# Patient Record
Sex: FEMALE | Race: WHITE | URBAN - METROPOLITAN AREA
[De-identification: names, ages, dates, MRNs, and addresses within clinical notes are randomized per-mention and may not be internally consistent; named-entity substitution may affect disease eponyms.]

---

## 2019-08-06 ENCOUNTER — APPOINTMENT (RX ONLY)
Dept: URBAN - METROPOLITAN AREA CLINIC 167 | Facility: CLINIC | Age: 34
Setting detail: DERMATOLOGY
End: 2019-08-06

## 2019-08-06 DIAGNOSIS — Z71.89 OTHER SPECIFIED COUNSELING: ICD-10-CM

## 2019-08-06 DIAGNOSIS — L30.8 OTHER SPECIFIED DERMATITIS: ICD-10-CM

## 2019-08-06 DIAGNOSIS — L70.0 ACNE VULGARIS: ICD-10-CM

## 2019-08-06 DIAGNOSIS — B07.8 OTHER VIRAL WARTS: ICD-10-CM

## 2019-08-06 PROCEDURE — ? LIQUID NITROGEN

## 2019-08-06 PROCEDURE — ? PRESCRIPTION

## 2019-08-06 PROCEDURE — ? TREATMENT REGIMEN

## 2019-08-06 PROCEDURE — ? COUNSELING

## 2019-08-06 PROCEDURE — 99203 OFFICE O/P NEW LOW 30 MIN: CPT | Mod: 25

## 2019-08-06 PROCEDURE — ? BENIGN DESTRUCTION

## 2019-08-06 PROCEDURE — ? OTHER

## 2019-08-06 PROCEDURE — 17110 DESTRUCTION B9 LES UP TO 14: CPT

## 2019-08-06 RX ORDER — TRETINOIN 0.25 MG/G
CREAM TOPICAL
Qty: 1 | Refills: 6 | Status: ERX | COMMUNITY
Start: 2019-08-06

## 2019-08-06 RX ORDER — DAPSONE 75 MG/G
GEL TOPICAL
Qty: 1 | Refills: 3 | Status: ERX | COMMUNITY
Start: 2019-08-06

## 2019-08-06 RX ADMIN — DAPSONE: 75 GEL TOPICAL at 00:00

## 2019-08-06 RX ADMIN — TRETINOIN: 0.25 CREAM TOPICAL at 00:00

## 2019-08-06 ASSESSMENT — LOCATION SIMPLE DESCRIPTION DERM
LOCATION SIMPLE: RIGHT PRETIBIAL REGION
LOCATION SIMPLE: RIGHT ANKLE
LOCATION SIMPLE: RIGHT CHEEK
LOCATION SIMPLE: LEFT THIGH
LOCATION SIMPLE: LEFT KNEE
LOCATION SIMPLE: LEFT CALF
LOCATION SIMPLE: RIGHT ANTERIOR NECK
LOCATION SIMPLE: LEFT PRETIBIAL REGION

## 2019-08-06 ASSESSMENT — LOCATION DETAILED DESCRIPTION DERM
LOCATION DETAILED: RIGHT INFERIOR ANTERIOR NECK
LOCATION DETAILED: LEFT KNEE
LOCATION DETAILED: LEFT ANTERIOR DISTAL THIGH
LOCATION DETAILED: RIGHT INFERIOR CENTRAL MALAR CHEEK
LOCATION DETAILED: LEFT PROXIMAL PRETIBIAL REGION
LOCATION DETAILED: LEFT DISTAL CALF
LOCATION DETAILED: RIGHT POSTERIOR ANKLE
LOCATION DETAILED: RIGHT DISTAL PRETIBIAL REGION
LOCATION DETAILED: LEFT PROXIMAL CALF
LOCATION DETAILED: LEFT DISTAL PRETIBIAL REGION

## 2019-08-06 ASSESSMENT — LOCATION ZONE DERM
LOCATION ZONE: NECK
LOCATION ZONE: FACE
LOCATION ZONE: LEG

## 2019-08-06 NOTE — PROCEDURE: BENIGN DESTRUCTION
Render Post-Care Instructions In Note?: no
Include Z78.9 (Other Specified Conditions Influencing Health Status) As An Associated Diagnosis?: Yes
Lot # (Optional): 
Post-Care Instructions: I reviewed with the patient in detail post-care instructions. Patient is to wear sunprotection, and avoid picking at any of the treated lesions. Pt may apply Vaseline to crusted or scabbing areas.
Detail Level: Detailed
Consent: The patient's consent was obtained including but not limited to risks of crusting, scabbing, blistering, scarring, darker or lighter pigmentary change, recurrence, incomplete removal and infection.
Expiration Date (Optional): 01/23/21
Medical Necessity Clause: This procedure was medically necessary because the lesions that were treated were:
Medical Necessity Information: It is in your best interest to select a reason for this procedure from the list below. All of these items fulfill various CMS LCD requirements except the new and changing color options.
Amount Injected (Include Units): 0.06 cc
Treatment Number (Will Not Render If 0): 0

## 2019-08-06 NOTE — HPI: WARTS (VERRUCA)
Treatment Number (Optional): 9
How Severe Are Your Warts?: moderate
Is This A New Presentation, Or A Follow-Up?: Wart

## 2019-08-06 NOTE — PROCEDURE: OTHER
Other (Free Text): Acne x 10 years plus\\nAlways seem hormonal\\ngetting cysts around chin, nose \\nNot taking birth control \\nStopped in 201, so started breaking out more\\n\\nThrough 2017, was pregnant or trying to get pregnant\\n2014 [place] and fields Unblemished was working well\\n1 year ago, was dry\\nNot much acne\\nBut started a new skin line and \\nBroke out with more cysts\\nHad a bad cluster\\nStopped those products\\nRestarted Unblemished x 1.5 months\\nHas Sulfur, BPO 2.5% and botanical extracts\\n\\nSaw another dermatologist at [facility]\\nUsed doxycycline and Aczone in the past\\nNo improvement\\nWants a 2nd opinion\\nWas told to take Accutane, but she doesn't think her acne is that bad\\nHer sister took it, but her acne was a lot worse\\nDiscussed aldactone\\nNot on birth control\\nNot planning on getting pregnant\\n\\nWill start tretinoin 0.025% cream\\nStart Aczone 7.5%\\n\\n2014\\nUsed Duac 1.2 % every morning\\nFinacea every night\\n
Detail Level: Detailed
Note Text (......Xxx Chief Complaint.): This diagnosis correlates with the
Other (Free Text): had flat warts 10 years ago\\nWere spreading\\nWas shaving at the time\\nWas modeling\\nNever went away with tx\\nTried candida injections and they went away\\nHas new ones now\\nHowever, she is using Hinds and getting a rash\\nShe can't tell which bumps are the rash and which are the warts - they are difficult to distinguish\\nThey are small red to skin colored papules, some with a flare
Other (Free Text): was told she had a skin tag\\nis a filiform wart

## 2019-08-06 NOTE — PROCEDURE: COUNSELING
Detail Level: Detailed
Erythromycin Pregnancy And Lactation Text: This medication is Pregnancy Category B and is considered safe during pregnancy. It is also excreted in breast milk.
Minocycline Counseling: Patient advised regarding possible photosensitivity and discoloration of the teeth, skin, lips, tongue and gums.  Patient instructed to avoid sunlight, if possible.  When exposed to sunlight, patients should wear protective clothing, sunglasses, and sunscreen.  The patient was instructed to call the office immediately if the following severe adverse effects occur:  hearing changes, easy bruising/bleeding, severe headache, or vision changes.  The patient verbalized understanding of the proper use and possible adverse effects of minocycline.  All of the patient's questions and concerns were addressed.
Topical Sulfur Applications Counseling: Topical Sulfur Counseling: Patient counseled that this medication may cause skin irritation or allergic reactions.  In the event of skin irritation, the patient was advised to reduce the amount of the drug applied or use it less frequently.   The patient verbalized understanding of the proper use and possible adverse effects of topical sulfur application.  All of the patient's questions and concerns were addressed.
Topical Clindamycin Pregnancy And Lactation Text: This medication is Pregnancy Category B and is considered safe during pregnancy. It is unknown if it is excreted in breast milk.
High Dose Vitamin A Pregnancy And Lactation Text: High dose vitamin A therapy is contraindicated during pregnancy and breast feeding.
Tetracycline Counseling: Patient counseled regarding possible photosensitivity and increased risk for sunburn.  Patient instructed to avoid sunlight, if possible.  When exposed to sunlight, patients should wear protective clothing, sunglasses, and sunscreen.  The patient was instructed to call the office immediately if the following severe adverse effects occur:  hearing changes, easy bruising/bleeding, severe headache, or vision changes.  The patient verbalized understanding of the proper use and possible adverse effects of tetracycline.  All of the patient's questions and concerns were addressed. Patient understands to avoid pregnancy while on therapy due to potential birth defects.
Azithromycin Pregnancy And Lactation Text: This medication is considered safe during pregnancy and is also secreted in breast milk.
Topical Retinoid Pregnancy And Lactation Text: This medication is Pregnancy Category C. It is unknown if this medication is excreted in breast milk.
Detail Level: Zone
Dapsone Counseling: I discussed with the patient the risks of dapsone including but not limited to hemolytic anemia, agranulocytosis, rashes, methemoglobinemia, kidney failure, peripheral neuropathy, headaches, GI upset, and liver toxicity.  Patients who start dapsone require monitoring including baseline LFTs and weekly CBCs for the first month, then every month thereafter.  The patient verbalized understanding of the proper use and possible adverse effects of dapsone.  All of the patient's questions and concerns were addressed.
Minocycline Pregnancy And Lactation Text: This medication is Pregnancy Category D and not consider safe during pregnancy. It is also excreted in breast milk.
Topical Sulfur Applications Pregnancy And Lactation Text: This medication is Pregnancy Category C and has an unknown safety profile during pregnancy. It is unknown if this topical medication is excreted in breast milk.
Benzoyl Peroxide Counseling: Patient counseled that medicine may cause skin irritation and bleach clothing.  In the event of skin irritation, the patient was advised to reduce the amount of the drug applied or use it less frequently.   The patient verbalized understanding of the proper use and possible adverse effects of benzoyl peroxide.  All of the patient's questions and concerns were addressed.
Bactrim Pregnancy And Lactation Text: This medication is Pregnancy Category D and is known to cause fetal risk.  It is also excreted in breast milk.
Tazorac Counseling:  Patient advised that medication is irritating and drying.  Patient may need to apply sparingly and wash off after an hour before eventually leaving it on overnight.  The patient verbalized understanding of the proper use and possible adverse effects of tazorac.  All of the patient's questions and concerns were addressed.
Bactrim Counseling:  I discussed with the patient the risks of sulfa antibiotics including but not limited to GI upset, allergic reaction, drug rash, diarrhea, dizziness, photosensitivity, and yeast infections.  Rarely, more serious reactions can occur including but not limited to aplastic anemia, agranulocytosis, methemoglobinemia, blood dyscrasias, liver or kidney failure, lung infiltrates or desquamative/blistering drug rashes.
Dapsone Pregnancy And Lactation Text: This medication is Pregnancy Category C and is not considered safe during pregnancy or breast feeding.
Isotretinoin Counseling: Patient should get monthly blood tests, not donate blood, not drive at night if vision affected, not share medication, and not undergo elective surgery for 6 months after tx completed. Side effects reviewed, pt to contact office should one occur.
Spironolactone Counseling: Patient advised regarding risks of diarrhea, abdominal pain, hyperkalemia, birth defects (for female patients), liver toxicity and renal toxicity. The patient may need blood work to monitor liver and kidney function and potassium levels while on therapy. The patient verbalized understanding of the proper use and possible adverse effects of spironolactone.  All of the patient's questions and concerns were addressed.
Benzoyl Peroxide Pregnancy And Lactation Text: This medication is Pregnancy Category C. It is unknown if benzoyl peroxide is excreted in breast milk.
Include Pregnancy/Lactation Warning?: No
Birth Control Pills Counseling: Birth Control Pill Counseling: I discussed with the patient the potential side effects of OCPs including but not limited to increased risk of stroke, heart attack, thrombophlebitis, deep venous thrombosis, hepatic adenomas, breast changes, GI upset, headaches, and depression.  The patient verbalized understanding of the proper use and possible adverse effects of OCPs. All of the patient's questions and concerns were addressed.
Topical Clindamycin Counseling: Patient counseled that this medication may cause skin irritation or allergic reactions.  In the event of skin irritation, the patient was advised to reduce the amount of the drug applied or use it less frequently.   The patient verbalized understanding of the proper use and possible adverse effects of clindamycin.  All of the patient's questions and concerns were addressed.
Doxycycline Pregnancy And Lactation Text: This medication is Pregnancy Category D and not consider safe during pregnancy. It is also excreted in breast milk but is considered safe for shorter treatment courses.
Tazorac Pregnancy And Lactation Text: This medication is not safe during pregnancy. It is unknown if this medication is excreted in breast milk.
Doxycycline Counseling:  Patient counseled regarding possible photosensitivity and increased risk for sunburn.  Patient instructed to avoid sunlight, if possible.  When exposed to sunlight, patients should wear protective clothing, sunglasses, and sunscreen.  The patient was instructed to call the office immediately if the following severe adverse effects occur:  hearing changes, easy bruising/bleeding, severe headache, or vision changes.  The patient verbalized understanding of the proper use and possible adverse effects of doxycycline.  All of the patient's questions and concerns were addressed.
Isotretinoin Pregnancy And Lactation Text: This medication is Pregnancy Category X and is considered extremely dangerous during pregnancy. It is unknown if it is excreted in breast milk.
Azithromycin Counseling:  I discussed with the patient the risks of azithromycin including but not limited to GI upset, allergic reaction, drug rash, diarrhea, and yeast infections.
Topical Retinoid counseling:  Patient advised to apply a pea-sized amount only at bedtime and wait 30 minutes after washing their face before applying.  If too drying, patient may add a non-comedogenic moisturizer. The patient verbalized understanding of the proper use and possible adverse effects of retinoids.  All of the patient's questions and concerns were addressed.
Birth Control Pills Pregnancy And Lactation Text: This medication should be avoided if pregnant and for the first 30 days post-partum.
Erythromycin Counseling:  I discussed with the patient the risks of erythromycin including but not limited to GI upset, allergic reaction, drug rash, diarrhea, increase in liver enzymes, and yeast infections.
High Dose Vitamin A Counseling: Side effects reviewed, pt to contact office should one occur.
Spironolactone Pregnancy And Lactation Text: This medication can cause feminization of the male fetus and should be avoided during pregnancy. The active metabolite is also found in breast milk.

## 2019-08-28 ENCOUNTER — APPOINTMENT (RX ONLY)
Dept: URBAN - METROPOLITAN AREA CLINIC 167 | Facility: CLINIC | Age: 34
Setting detail: DERMATOLOGY
End: 2019-08-28

## 2019-08-28 DIAGNOSIS — B07.8 OTHER VIRAL WARTS: ICD-10-CM | Status: RESOLVED

## 2019-08-28 DIAGNOSIS — L30.8 OTHER SPECIFIED DERMATITIS: ICD-10-CM | Status: RESOLVED

## 2019-08-28 DIAGNOSIS — L85.8 OTHER SPECIFIED EPIDERMAL THICKENING: ICD-10-CM

## 2019-08-28 DIAGNOSIS — L91.8 OTHER HYPERTROPHIC DISORDERS OF THE SKIN: ICD-10-CM

## 2019-08-28 PROCEDURE — ? COUNSELING

## 2019-08-28 PROCEDURE — ? TREATMENT REGIMEN

## 2019-08-28 PROCEDURE — 99213 OFFICE O/P EST LOW 20 MIN: CPT

## 2019-08-28 PROCEDURE — ? OTHER

## 2019-08-28 ASSESSMENT — LOCATION ZONE DERM
LOCATION ZONE: AXILLAE
LOCATION ZONE: LEG
LOCATION ZONE: FEET

## 2019-08-28 ASSESSMENT — LOCATION SIMPLE DESCRIPTION DERM
LOCATION SIMPLE: LEFT PLANTAR SURFACE
LOCATION SIMPLE: RIGHT PLANTAR SURFACE
LOCATION SIMPLE: RIGHT PRETIBIAL REGION
LOCATION SIMPLE: LEFT PRETIBIAL REGION
LOCATION SIMPLE: RIGHT AXILLARY VAULT

## 2019-08-28 ASSESSMENT — LOCATION DETAILED DESCRIPTION DERM
LOCATION DETAILED: LEFT DISTAL PRETIBIAL REGION
LOCATION DETAILED: RIGHT DISTAL PRETIBIAL REGION
LOCATION DETAILED: RIGHT LATERAL PLANTAR HEEL
LOCATION DETAILED: LEFT MEDIAL PLANTAR HEEL
LOCATION DETAILED: RIGHT AXILLARY VAULT

## 2019-08-28 NOTE — PROCEDURE: OTHER
Other (Free Text): had flat warts 10 years ago\\nWent away with candida injections\\n\\nlast seen 8/6/19\\nWas difficult to tell which bumps were the rash and which are the warts \\ntreated with candida injections\\nno remaining warts\\nthe skin colored papules are folliculocentric - not warts
Detail Level: Detailed
Note Text (......Xxx Chief Complaint.): This diagnosis correlates with the
Other (Free Text): Stopped shaving\\nStopped Hinds\\nRed bumps have resolved

## 2019-11-06 ENCOUNTER — APPOINTMENT (RX ONLY)
Dept: URBAN - METROPOLITAN AREA CLINIC 167 | Facility: CLINIC | Age: 34
Setting detail: DERMATOLOGY
End: 2019-11-06

## 2019-11-06 DIAGNOSIS — L70.0 ACNE VULGARIS: ICD-10-CM

## 2019-11-06 PROCEDURE — 99213 OFFICE O/P EST LOW 20 MIN: CPT

## 2019-11-06 PROCEDURE — ? PRESCRIPTION

## 2019-11-06 PROCEDURE — ? COUNSELING

## 2019-11-06 PROCEDURE — ? OTHER

## 2019-11-06 PROCEDURE — ? TREATMENT REGIMEN

## 2019-11-06 RX ORDER — DOXYCYCLINE 40 MG/1
CAPSULE ORAL
Qty: 30 | Refills: 3 | Status: ERX | COMMUNITY
Start: 2019-11-06

## 2019-11-06 RX ORDER — TRETINOIN 0.5 MG/G
CREAM TOPICAL
Qty: 1 | Refills: 6 | Status: ERX | COMMUNITY
Start: 2019-11-06

## 2019-11-06 RX ADMIN — TRETINOIN: 0.5 CREAM TOPICAL at 00:00

## 2019-11-06 RX ADMIN — DOXYCYCLINE: 40 CAPSULE ORAL at 00:00

## 2019-11-06 ASSESSMENT — LOCATION SIMPLE DESCRIPTION DERM: LOCATION SIMPLE: RIGHT CHEEK

## 2019-11-06 ASSESSMENT — LOCATION DETAILED DESCRIPTION DERM: LOCATION DETAILED: RIGHT INFERIOR CENTRAL MALAR CHEEK

## 2019-11-06 ASSESSMENT — LOCATION ZONE DERM: LOCATION ZONE: FACE

## 2019-11-06 NOTE — PROCEDURE: COUNSELING
Benzoyl Peroxide Pregnancy And Lactation Text: This medication is Pregnancy Category C. It is unknown if benzoyl peroxide is excreted in breast milk.
Detail Level: Zone
Birth Control Pills Counseling: Birth Control Pill Counseling: I discussed with the patient the potential side effects of OCPs including but not limited to increased risk of stroke, heart attack, thrombophlebitis, deep venous thrombosis, hepatic adenomas, breast changes, GI upset, headaches, and depression.  The patient verbalized understanding of the proper use and possible adverse effects of OCPs. All of the patient's questions and concerns were addressed.
Tetracycline Counseling: Patient counseled regarding possible photosensitivity and increased risk for sunburn.  Patient instructed to avoid sunlight, if possible.  When exposed to sunlight, patients should wear protective clothing, sunglasses, and sunscreen.  The patient was instructed to call the office immediately if the following severe adverse effects occur:  hearing changes, easy bruising/bleeding, severe headache, or vision changes.  The patient verbalized understanding of the proper use and possible adverse effects of tetracycline.  All of the patient's questions and concerns were addressed. Patient understands to avoid pregnancy while on therapy due to potential birth defects.
Topical Sulfur Applications Counseling: Topical Sulfur Counseling: Patient counseled that this medication may cause skin irritation or allergic reactions.  In the event of skin irritation, the patient was advised to reduce the amount of the drug applied or use it less frequently.   The patient verbalized understanding of the proper use and possible adverse effects of topical sulfur application.  All of the patient's questions and concerns were addressed.
Isotretinoin Counseling: Patient should get monthly blood tests, not donate blood, not drive at night if vision affected, not share medication, and not undergo elective surgery for 6 months after tx completed. Side effects reviewed, pt to contact office should one occur.
Azithromycin Pregnancy And Lactation Text: This medication is considered safe during pregnancy and is also secreted in breast milk.
Topical Clindamycin Pregnancy And Lactation Text: This medication is Pregnancy Category B and is considered safe during pregnancy. It is unknown if it is excreted in breast milk.
Bactrim Counseling:  I discussed with the patient the risks of sulfa antibiotics including but not limited to GI upset, allergic reaction, drug rash, diarrhea, dizziness, photosensitivity, and yeast infections.  Rarely, more serious reactions can occur including but not limited to aplastic anemia, agranulocytosis, methemoglobinemia, blood dyscrasias, liver or kidney failure, lung infiltrates or desquamative/blistering drug rashes.
Benzoyl Peroxide Counseling: Patient counseled that medicine may cause skin irritation and bleach clothing.  In the event of skin irritation, the patient was advised to reduce the amount of the drug applied or use it less frequently.   The patient verbalized understanding of the proper use and possible adverse effects of benzoyl peroxide.  All of the patient's questions and concerns were addressed.
Spironolactone Counseling: Patient advised regarding risks of diarrhea, abdominal pain, hyperkalemia, birth defects (for female patients), liver toxicity and renal toxicity. The patient may need blood work to monitor liver and kidney function and potassium levels while on therapy. The patient verbalized understanding of the proper use and possible adverse effects of spironolactone.  All of the patient's questions and concerns were addressed.
Erythromycin Pregnancy And Lactation Text: This medication is Pregnancy Category B and is considered safe during pregnancy. It is also excreted in breast milk.
Isotretinoin Pregnancy And Lactation Text: This medication is Pregnancy Category X and is considered extremely dangerous during pregnancy. It is unknown if it is excreted in breast milk.
Birth Control Pills Pregnancy And Lactation Text: This medication should be avoided if pregnant and for the first 30 days post-partum.
Use Enhanced Medication Counseling?: No
Minocycline Pregnancy And Lactation Text: This medication is Pregnancy Category D and not consider safe during pregnancy. It is also excreted in breast milk.
Topical Retinoid Pregnancy And Lactation Text: This medication is Pregnancy Category C. It is unknown if this medication is excreted in breast milk.
Topical Clindamycin Counseling: Patient counseled that this medication may cause skin irritation or allergic reactions.  In the event of skin irritation, the patient was advised to reduce the amount of the drug applied or use it less frequently.   The patient verbalized understanding of the proper use and possible adverse effects of clindamycin.  All of the patient's questions and concerns were addressed.
High Dose Vitamin A Pregnancy And Lactation Text: High dose vitamin A therapy is contraindicated during pregnancy and breast feeding.
Tazorac Pregnancy And Lactation Text: This medication is not safe during pregnancy. It is unknown if this medication is excreted in breast milk.
Azithromycin Counseling:  I discussed with the patient the risks of azithromycin including but not limited to GI upset, allergic reaction, drug rash, diarrhea, and yeast infections.
Minocycline Counseling: Patient advised regarding possible photosensitivity and discoloration of the teeth, skin, lips, tongue and gums.  Patient instructed to avoid sunlight, if possible.  When exposed to sunlight, patients should wear protective clothing, sunglasses, and sunscreen.  The patient was instructed to call the office immediately if the following severe adverse effects occur:  hearing changes, easy bruising/bleeding, severe headache, or vision changes.  The patient verbalized understanding of the proper use and possible adverse effects of minocycline.  All of the patient's questions and concerns were addressed.
Erythromycin Counseling:  I discussed with the patient the risks of erythromycin including but not limited to GI upset, allergic reaction, drug rash, diarrhea, increase in liver enzymes, and yeast infections.
Doxycycline Pregnancy And Lactation Text: This medication is Pregnancy Category D and not consider safe during pregnancy. It is also excreted in breast milk but is considered safe for shorter treatment courses.
Doxycycline Counseling:  Patient counseled regarding possible photosensitivity and increased risk for sunburn.  Patient instructed to avoid sunlight, if possible.  When exposed to sunlight, patients should wear protective clothing, sunglasses, and sunscreen.  The patient was instructed to call the office immediately if the following severe adverse effects occur:  hearing changes, easy bruising/bleeding, severe headache, or vision changes.  The patient verbalized understanding of the proper use and possible adverse effects of doxycycline.  All of the patient's questions and concerns were addressed.
Topical Retinoid counseling:  Patient advised to apply a pea-sized amount only at bedtime and wait 30 minutes after washing their face before applying.  If too drying, patient may add a non-comedogenic moisturizer. The patient verbalized understanding of the proper use and possible adverse effects of retinoids.  All of the patient's questions and concerns were addressed.
High Dose Vitamin A Counseling: Side effects reviewed, pt to contact office should one occur.
Spironolactone Pregnancy And Lactation Text: This medication can cause feminization of the male fetus and should be avoided during pregnancy. The active metabolite is also found in breast milk.
Tazorac Counseling:  Patient advised that medication is irritating and drying.  Patient may need to apply sparingly and wash off after an hour before eventually leaving it on overnight.  The patient verbalized understanding of the proper use and possible adverse effects of tazorac.  All of the patient's questions and concerns were addressed.
Bactrim Pregnancy And Lactation Text: This medication is Pregnancy Category D and is known to cause fetal risk.  It is also excreted in breast milk.
Topical Sulfur Applications Pregnancy And Lactation Text: This medication is Pregnancy Category C and has an unknown safety profile during pregnancy. It is unknown if this topical medication is excreted in breast milk.
Dapsone Pregnancy And Lactation Text: This medication is Pregnancy Category C and is not considered safe during pregnancy or breast feeding.
Dapsone Counseling: I discussed with the patient the risks of dapsone including but not limited to hemolytic anemia, agranulocytosis, rashes, methemoglobinemia, kidney failure, peripheral neuropathy, headaches, GI upset, and liver toxicity.  Patients who start dapsone require monitoring including baseline LFTs and weekly CBCs for the first month, then every month thereafter.  The patient verbalized understanding of the proper use and possible adverse effects of dapsone.  All of the patient's questions and concerns were addressed.

## 2019-11-06 NOTE — PROCEDURE: OTHER
Detail Level: Detailed
Other (Free Text): Last seen 8/28/19\\n\\nUsing aczone 7.5% twice a day\\nNot sure if it is helping\\n\\nTretinoin 0.025% nightly\\nWas a little drying at first, slightly itchy\\nTolerating well now\\nnot too drying\\nHas sensitive skin\\nAcne is not getting better\\nStill gets red bumps around her period\\nIncrease to tretinoin 0.05% cream\\n\\nFace wash and moisturizer - [place] and fields Unblemished line\\nHas BPO, sulfur\\nUses Cerave pm\\n\\nDiscussed spironolactone again\\nCan consider it in the future if needed\\nNot taking birth control\\n\\nWill also add Oracea 40mg QD for flares to see if it can help\\n\\nWas told to take Accutane, but she doesn't think her acne is that bad \\nHer sister took it, but her acne was a lot worse
Note Text (......Xxx Chief Complaint.): This diagnosis correlates with the

## 2019-11-14 ENCOUNTER — APPOINTMENT (RX ONLY)
Dept: URBAN - METROPOLITAN AREA CLINIC 167 | Facility: CLINIC | Age: 34
Setting detail: DERMATOLOGY
End: 2019-11-14

## 2019-11-14 DIAGNOSIS — L72.8 OTHER FOLLICULAR CYSTS OF THE SKIN AND SUBCUTANEOUS TISSUE: ICD-10-CM

## 2019-11-14 PROCEDURE — ? COUNSELING

## 2019-11-14 PROCEDURE — ? INTRALESIONAL KENALOG

## 2019-11-14 PROCEDURE — 11900 INJECT SKIN LESIONS </W 7: CPT

## 2019-11-14 ASSESSMENT — LOCATION SIMPLE DESCRIPTION DERM
LOCATION SIMPLE: RIGHT LIP
LOCATION SIMPLE: CHIN

## 2019-11-14 ASSESSMENT — LOCATION ZONE DERM
LOCATION ZONE: FACE
LOCATION ZONE: LIP

## 2019-11-14 ASSESSMENT — LOCATION DETAILED DESCRIPTION DERM
LOCATION DETAILED: RIGHT LOWER CUTANEOUS LIP
LOCATION DETAILED: LEFT CHIN

## 2019-11-14 NOTE — PROCEDURE: INTRALESIONAL KENALOG
Concentration Of Solution Injected (Mg/Ml): 3.0
Detail Level: Zone
Administered By (Optional): Dr Shah
Total Volume Injected (Ccs- Only Use Numbers And Decimals): 0.2
Include Z78.9 (Other Specified Conditions Influencing Health Status) As An Associated Diagnosis?: No
Kenalog Preparation: Kenalog
X Size Of Lesion In Cm (Optional): 0
Lot # (Optional): [id number]
Consent: The risks of atrophy were reviewed with the patient.
Expiration Date (Optional): 11/14/19
Medical Necessity Clause: This procedure was medically necessary because the lesions that were treated were:

## 2021-03-08 ENCOUNTER — APPOINTMENT (RX ONLY)
Dept: URBAN - METROPOLITAN AREA CLINIC 170 | Facility: CLINIC | Age: 36
Setting detail: DERMATOLOGY
End: 2021-03-08

## 2021-03-08 DIAGNOSIS — L72.8 OTHER FOLLICULAR CYSTS OF THE SKIN AND SUBCUTANEOUS TISSUE: ICD-10-CM

## 2021-03-08 PROCEDURE — 11900 INJECT SKIN LESIONS </W 7: CPT

## 2021-03-08 PROCEDURE — ? COUNSELING

## 2021-03-08 PROCEDURE — ? OTHER

## 2021-03-08 PROCEDURE — ? TREATMENT REGIMEN

## 2021-03-08 PROCEDURE — ? INTRALESIONAL KENALOG

## 2021-03-08 ASSESSMENT — LOCATION SIMPLE DESCRIPTION DERM: LOCATION SIMPLE: CHIN

## 2021-03-08 ASSESSMENT — LOCATION ZONE DERM: LOCATION ZONE: FACE

## 2021-03-08 ASSESSMENT — LOCATION DETAILED DESCRIPTION DERM: LOCATION DETAILED: LEFT MENTAL CREASE

## 2021-03-08 NOTE — PROCEDURE: INTRALESIONAL KENALOG
Kenalog Preparation: Kenalog
Expiration Date (Optional): 3/16/21
Include Z78.9 (Other Specified Conditions Influencing Health Status) As An Associated Diagnosis?: No
X Size Of Lesion In Cm (Optional): 0
Concentration Of Solution Injected (Mg/Ml): 3.0
Administered By (Optional): Dr Shah
Detail Level: Zone
Total Volume Injected (Ccs- Only Use Numbers And Decimals): 0.1
Medical Necessity Clause: This procedure was medically necessary because the lesions that were treated were:
Consent: The risks of atrophy were reviewed with the patient.
Lot # (Optional): [id number]

## 2021-03-08 NOTE — PROCEDURE: OTHER
Detail Level: Detailed
Render Risk Assessment In Note?: no
Note Text (......Xxx Chief Complaint.): This diagnosis correlates with the
Other (Free Text): Acne cyst on chin\\nPresent for 2 weeks\\nMild tenderness\\nHas had previous injections but not on this lesion\\nwants ILTAC\\n\\nNot using topical Rxs\\nUses over the counter 5% Benzoyl Peroxide spot tx\\n\\nSkin has been pretty good\\nUsed aczone in the past, but it didn’t really help\\nDoes not want an Rx\\nWill give a sample

## 2021-05-12 ENCOUNTER — APPOINTMENT (RX ONLY)
Dept: URBAN - METROPOLITAN AREA CLINIC 167 | Facility: CLINIC | Age: 36
Setting detail: DERMATOLOGY
End: 2021-05-12

## 2021-05-12 DIAGNOSIS — L72.8 OTHER FOLLICULAR CYSTS OF THE SKIN AND SUBCUTANEOUS TISSUE: ICD-10-CM

## 2021-05-12 PROCEDURE — 11900 INJECT SKIN LESIONS </W 7: CPT

## 2021-05-12 PROCEDURE — ? COUNSELING

## 2021-05-12 PROCEDURE — ? OTHER

## 2021-05-12 PROCEDURE — ? INTRALESIONAL KENALOG

## 2021-05-12 PROCEDURE — ? TREATMENT REGIMEN

## 2021-05-12 ASSESSMENT — LOCATION SIMPLE DESCRIPTION DERM: LOCATION SIMPLE: RIGHT CHEEK

## 2021-05-12 ASSESSMENT — LOCATION ZONE DERM: LOCATION ZONE: FACE

## 2021-05-12 ASSESSMENT — LOCATION DETAILED DESCRIPTION DERM: LOCATION DETAILED: RIGHT INFERIOR MEDIAL MALAR CHEEK

## 2021-05-12 NOTE — PROCEDURE: OTHER
Other (Free Text): Last visit 3/8/21\\nhad an acne cyst on chin\\ntreated with ILTAC\\n- her  drained it and used a mini chemical peel a few days later because it wasn't going down\\n- it dried up and went away a couple days later\\n\\nnow has a new spot on her R cheek - she doesn't usually get them there\\n\\nher inflammatory lesions rarely come to a head\\nSometimes they do\\nCan use acne absorbing covers\\n\\nSometimes uses over the counter 5% Benzoyl Peroxide spot tx\\nUsed aczone in the past, but it didn’t really help\\nwill give a sample of Zilxi QD today - she can call if she wants an Rx\\n
Detail Level: Detailed
Render Risk Assessment In Note?: no
Note Text (......Xxx Chief Complaint.): This diagnosis correlates with the

## 2021-05-12 NOTE — PROCEDURE: INTRALESIONAL KENALOG
Detail Level: Zone
Lot # (Optional): [id number]
Validate Note Data When Using Inventory: Yes
Include Z78.9 (Other Specified Conditions Influencing Health Status) As An Associated Diagnosis?: No
Medical Necessity Clause: This procedure was medically necessary because the lesions that were treated were:
Consent: The risks of atrophy were reviewed with the patient.
Administered By (Optional): Dr Shah
Expiration Date (Optional): 5/20/21
X Size Of Lesion In Cm (Optional): 0
Concentration Of Solution Injected (Mg/Ml): 3.0
Kenalog Preparation: Kenalog
Total Volume Injected (Ccs- Only Use Numbers And Decimals): 0.1

## 2025-02-15 NOTE — HPI: CYST
Is This A New Presentation, Or A Follow-Up?: Cyst
You can access the FollowMyHealth Patient Portal offered by [facility] by registering at the following website: [email or web address]. By joining [email or web address] FollowMyHealth portal, you will also be able to view your health information using other applications (apps) compatible with our system.